# Patient Record
Sex: MALE | Race: WHITE | NOT HISPANIC OR LATINO | Employment: FULL TIME | URBAN - METROPOLITAN AREA
[De-identification: names, ages, dates, MRNs, and addresses within clinical notes are randomized per-mention and may not be internally consistent; named-entity substitution may affect disease eponyms.]

---

## 2018-01-12 ENCOUNTER — OFFICE VISIT (OUTPATIENT)
Dept: URGENT CARE | Facility: CLINIC | Age: 73
End: 2018-01-12
Payer: COMMERCIAL

## 2018-01-12 VITALS
HEIGHT: 75 IN | HEART RATE: 80 BPM | TEMPERATURE: 99 F | DIASTOLIC BLOOD PRESSURE: 88 MMHG | OXYGEN SATURATION: 97 % | RESPIRATION RATE: 17 BRPM | WEIGHT: 230 LBS | SYSTOLIC BLOOD PRESSURE: 140 MMHG | BODY MASS INDEX: 28.6 KG/M2

## 2018-01-12 DIAGNOSIS — T70.0XXA OTITIC BAROTRAUMA, INITIAL ENCOUNTER: ICD-10-CM

## 2018-01-12 DIAGNOSIS — J10.1 INFLUENZA A: Primary | ICD-10-CM

## 2018-01-12 LAB
CTP QC/QA: YES
FLUAV AG NPH QL: POSITIVE
FLUBV AG NPH QL: NEGATIVE

## 2018-01-12 PROCEDURE — 99203 OFFICE O/P NEW LOW 30 MIN: CPT | Mod: 25,S$GLB,, | Performed by: FAMILY MEDICINE

## 2018-01-12 PROCEDURE — 87804 INFLUENZA ASSAY W/OPTIC: CPT | Mod: 59,QW,S$GLB, | Performed by: FAMILY MEDICINE

## 2018-01-12 PROCEDURE — 96372 THER/PROPH/DIAG INJ SC/IM: CPT | Mod: S$GLB,,, | Performed by: FAMILY MEDICINE

## 2018-01-12 RX ORDER — OSELTAMIVIR PHOSPHATE 75 MG/1
75 CAPSULE ORAL 2 TIMES DAILY
Qty: 10 CAPSULE | Refills: 0 | Status: SHIPPED | OUTPATIENT
Start: 2018-01-12 | End: 2018-01-17

## 2018-01-12 RX ORDER — BETAMETHASONE SODIUM PHOSPHATE AND BETAMETHASONE ACETATE 3; 3 MG/ML; MG/ML
6 INJECTION, SUSPENSION INTRA-ARTICULAR; INTRALESIONAL; INTRAMUSCULAR; SOFT TISSUE
Status: COMPLETED | OUTPATIENT
Start: 2018-01-12 | End: 2018-01-12

## 2018-01-12 RX ADMIN — BETAMETHASONE SODIUM PHOSPHATE AND BETAMETHASONE ACETATE 6 MG: 3; 3 INJECTION, SUSPENSION INTRA-ARTICULAR; INTRALESIONAL; INTRAMUSCULAR; SOFT TISSUE at 12:01

## 2018-01-12 NOTE — PATIENT INSTRUCTIONS
Take Mucinex D in the morning and Mucinex DM at night. Mucinex D has Pseudoephedrine in it and can cause palpitations and high blood pressure. If you experience this stop the Mucinex D and only take Mucinex DM (twice a day).  You have been diagnosed with Influenza.   You are contagious for 24 hours after you start the Tamilfu or 24 hours after your last fever, whichever happens last.  Please drink plenty of fluids.  Please get plenty of rest.  Please return here or go to the Emergency Department for any concerns or worsening of condition.  Tamiflu prescription has been discussed and if prescribed, please take to completion unless you cannot tolerate the side effects.   If you were prescribed a narcotic medication, do not drive or operate heavy equipment or machinery while taking these medications.  If you were given a steroid shot in the clinic and have also been given a prescription for a steroid such as Prednisone or a Medrol Dose Pack, please begin taking them tomorrow.  Take tylenol (acetominophen) for fever, chills or body aches every 4 hours. do not exceed 4000 mg/ day.  Take Motrin (Ibuprofen) every 4 hours for fever, chills, pain or inflammation.  Use an antihistmine such as claritin or zyrtec to dry you out. Use pseudoephedrine (behind the counter) to decongest (beware this can raise your blood pressure). Use mucinex (guaifenisin) to break up mucous    Follow up with your Primary Care Provider in 2-3 days if no improvement.  If you do not have one, please see the list provided and become established with one.  If your condition worsens we recommend that you receive another evaluation at the emergency room immediately or contact your primary medical clinics after hours call service to discuss your concerns.  You can try breathe right strips at night to help you breathe.  A cool mist humidifier in bedroom may help with cough and relieve stuffy nose. Use afrin for no longer than 3 days and watch your blood  pressure.   Sore throat:  Lozenge, hard candy or honey.    Sinus rinses DO NOT USE TAP WATER, if you must, water must be a rolling boil for 1 minute, let it cool, then use.  May use distilled water, or over the counter nasal saline rinses.  Vics vapor rub in shower to help open nasal passages.  May use nasal gel to keep passages moisturized.  May use Nasal saline sprays during the day for added relief of congestion.   For those who go to the gym, please do not use the sauna or steam room now to clear sinuses.  During pollen season, change shirt if you are outside for a while when you go in.  Also wash your face.  Do not touch your face with your hands.  Wash your hands often in general while ill, avoid face contact with hands. Good nutrition. Lots of rest. Plenty of fluids  Over the counter you can use Tylenol (acetominophen) or Ibuprofen for your minor aches and pains as long as you have no contraindications.    You must understand that you've received an Urgent Care treatment only and that you may be released before all your medical problems are known or treated. You, the patient, will arrange for follow up care as instructed.    Treatment for Ear Barotrauma    Ear barotrauma is a type of ear damage. It is caused by a difference in pressure between the inside of the ear and the air around you. It can cause pain and may lead to lasting hearing loss. It can harm the eardrum. The eardrum is between the outer and middle ear. Harm to the eardrum can cause bleeding or other damage to the outer, middle, or inner ear.  Types of treatment  You may be referred to an ear, nose, and throat doctor (otolaryngologist). You may not need any treatment. Most barotrauma injuries heal on their own with time, and your symptoms will go away. But your eardrum may not heal as usual if a blast caused the injury.  Your healthcare provider may tell you to rest in bed with your head raised on a pillow. You may also need to take medicine,  such as:  · Pain medicines  · Nasal steroids and decongestants, to reduce congestion around the Eustachian tube opening  · Antibiotics, if an infection develops  You may need surgery if your ear barotrauma is severe. A surgeon may rebuild the eardrum or the opening into the inner ear. A tiny cut may be made in the eardrum. In rare cases, a ventilation tube in the eardrum may be needed.  What happens if you dont get treated?  In some cases, ear barotrauma can cause symptoms that dont go away such as:  · Dizziness  · Ringing in the ears  · Hearing loss that may require you to use a hearing aid  Follow your healthcare providers advice about best rest or surgery. This may help cut your risk for these problems.  Preventing ear barotrauma  You can take steps to help prevent ear barotrauma. If you are congested from a cold or allergies, you may want to postpone flying or scuba diving. Or you can take medicines such as a decongestant or antihistamine. These may help your ears equalize more easily and prevent ear barotrauma.  You can do certain things to open the Eustachian tube during pressure changes, such as:  · Swallowing often  · Pinching your nose, closing your mouth, and then acting as if you were going to breathe out through your nose  · Chewing gum or candy  · Using special ear plugs during flying  Using a ventilation tube is a choice for some people whose Eustachian tubes dont work well or for those who need to fly often. These may help you if you need high pressure oxygen therapy for wound healing. A surgeon places these tubes in the eardrum. They then help even out pressure differences. Ventilation tubes cannot prevent ear barotrauma caused by diving.  Coping with ear barotrauma  If you are a diver, dont dive again until your injury has fully healed. Diving again too soon can cause reinjury. Your doctor will tell you when it is safe for you to dive again. You should also not fly until your doctor says it is  OK.     When to call your healthcare provider  Call your healthcare provider right away if:  · Your symptoms dont get better  · You have any new symptoms, such as fever or hearing loss   Date Last Reviewed: 8/3/2015  © 7898-0100 [employer]. [address] [place] [address], [place] [address]. All rights reserved. This information is not intended as a substitute for professional medical care. Always follow your healthcare professional's instructions.

## 2018-01-12 NOTE — PROGRESS NOTES
"Subjective:       Patient ID: Bruce Rodriguez is a 72 y.o. male.    Vitals:  height is 6' 3" (1.905 m) and weight is 104.3 kg (230 lb). His temperature is 98.6 °F (37 °C). His blood pressure is 140/88 (abnormal) and his pulse is 80. His respiration is 17 and oxygen saturation is 97%.     Chief Complaint: Nasal Congestion (2 days)    Pt presents with congestion and sinus pressure. Pt states symptoms started about 2 days ago. Pt denies any body aches or chills or fever. Pt denies any OTC meds. Pt states he has pressure in his ears, and he flew in yesterday from [place] and that made his ears worse. He is here visiting. Biggest complaint is sinuses and ears and eyes. No fever no body aches - didn't go in but thought he had the flu new years cher.      Sinus Problem   The current episode started in the past 7 days. The problem has been gradually worsening since onset. There has been no fever. Associated symptoms include congestion and ear pain. Pertinent negatives include no chills, coughing, headaches, hoarse voice, shortness of breath or sore throat. Past treatments include nothing. The treatment provided no relief.     Review of Systems   Constitution: Negative for chills, fever and malaise/fatigue.   HENT: Positive for congestion and ear pain. Negative for hoarse voice and sore throat.    Eyes: Negative for discharge and redness.   Cardiovascular: Negative for chest pain, dyspnea on exertion and leg swelling.   Respiratory: Negative for cough, shortness of breath, sputum production and wheezing.    Musculoskeletal: Negative for myalgias.   Gastrointestinal: Negative for abdominal pain and nausea.   Neurological: Negative for headaches.       Objective:      Physical Exam   Constitutional: He is oriented to person, place, and time. He appears well-developed and well-nourished. He is cooperative.  Non-toxic appearance. He does not appear ill. No distress.   HENT:   Head: Normocephalic and atraumatic.   Right Ear: Hearing, " external ear and ear canal normal. A middle ear effusion is present. There is hemotympanum.   Left Ear: Hearing, external ear and ear canal normal. A middle ear effusion is present.   Nose: Nose normal. No mucosal edema, rhinorrhea or nasal deformity. No epistaxis. Right sinus exhibits no maxillary sinus tenderness and no frontal sinus tenderness. Left sinus exhibits no maxillary sinus tenderness and no frontal sinus tenderness.   Mouth/Throat: Uvula is midline and mucous membranes are normal. No trismus in the jaw. Normal dentition. No uvula swelling. Oropharyngeal exudate (clear) present. No posterior oropharyngeal erythema.   Eyes: Conjunctivae and lids are normal. No scleral icterus.   Sclera clear bilat   Neck: Trachea normal, full passive range of motion without pain and phonation normal. Neck supple.   Cardiovascular: Normal rate, regular rhythm, normal heart sounds, intact distal pulses and normal pulses.    Pulmonary/Chest: Effort normal and breath sounds normal. No respiratory distress.   Abdominal: Normal appearance. He exhibits no distension. There is no tenderness.   Musculoskeletal: Normal range of motion. He exhibits no edema or deformity.   Neurological: He is alert and oriented to person, place, and time. He exhibits normal muscle tone. Coordination normal.   Skin: Skin is warm, dry and intact. He is not diaphoretic. No pallor.   Psychiatric: He has a normal mood and affect. His speech is normal and behavior is normal. Judgment and thought content normal. Cognition and memory are normal.   Nursing note and vitals reviewed.      Assessment:       1. Influenza A    2. Otitic barotrauma, initial encounter        Plan:         Influenza A  -     POCT Influenza A/B  -     oseltamivir (TAMIFLU) 75 MG capsule; Take 1 capsule (75 mg total) by mouth 2 (two) times daily.  Dispense: 10 capsule; Refill: 0  -     betamethasone acetate-betamethasone sodium phosphate injection 6 mg; Inject 1 mL (6 mg total) into  the muscle one time.    Otitic barotrauma, initial encounter  -     betamethasone acetate-betamethasone sodium phosphate injection 6 mg; Inject 1 mL (6 mg total) into the muscle one time.    Other orders  -     Cancel: POCT Influenza A/B          Recent Results (from the past 48 hour(s))   POCT Influenza A/B    Collection Time: 01/12/18 12:05 PM   Result Value Ref Range    Rapid Influenza A Ag Positive (A) Negative    Rapid Influenza B Ag Negative Negative     Acceptable Yes    ]